# Patient Record
Sex: FEMALE | Race: OTHER | Employment: OTHER | URBAN - METROPOLITAN AREA
[De-identification: names, ages, dates, MRNs, and addresses within clinical notes are randomized per-mention and may not be internally consistent; named-entity substitution may affect disease eponyms.]

---

## 2020-08-04 LAB
CHLAMYDIA, EXTERNAL: NEGATIVE
N. GONORRHEA, EXTERNAL: NEGATIVE
NIPT, EXTERNAL: NEGATIVE
URINALYSIS, EXTERNAL: NO GROWTH

## 2020-09-23 ENCOUNTER — OFFICE VISIT (OUTPATIENT)
Dept: OBGYN CLINIC | Age: 30
End: 2020-09-23
Payer: COMMERCIAL

## 2020-09-23 VITALS
DIASTOLIC BLOOD PRESSURE: 68 MMHG | SYSTOLIC BLOOD PRESSURE: 110 MMHG | WEIGHT: 127.8 LBS | HEIGHT: 66 IN | BODY MASS INDEX: 20.54 KG/M2

## 2020-09-23 DIAGNOSIS — Z36.9 UNSPECIFIED ANTENATAL SCREENING: ICD-10-CM

## 2020-09-23 DIAGNOSIS — Z34.90 PREGNANCY, UNSPECIFIED GESTATIONAL AGE: ICD-10-CM

## 2020-09-23 DIAGNOSIS — Z23 ENCOUNTER FOR IMMUNIZATION: Primary | ICD-10-CM

## 2020-09-23 LAB
AFP, MATERNAL, EXTERNAL: NEGATIVE
ANTIBODY SCREEN, EXTERNAL: NEGATIVE
HBSAG, EXTERNAL: NEGATIVE
HCT, EXTERNAL: 30.3
HGB EVAL, EXTERNAL: NORMAL
HGB, EXTERNAL: 10.5
HIV, EXTERNAL: NON REACTIVE
PLATELET CNT,   EXTERNAL: 218
RPR, EXTERNAL: NON REACTIVE
RUBELLA, EXTERNAL: NORMAL
TYPE, ABO & RH, EXTERNAL: NORMAL
VARICELLA, EXTERNAL: NORMAL

## 2020-09-23 PROCEDURE — 90686 IIV4 VACC NO PRSV 0.5 ML IM: CPT

## 2020-09-23 PROCEDURE — 90471 IMMUNIZATION ADMIN: CPT

## 2020-09-23 PROCEDURE — 0502F SUBSEQUENT PRENATAL CARE: CPT | Performed by: OBSTETRICS & GYNECOLOGY

## 2020-09-23 NOTE — PROGRESS NOTES
Initial OB Visit Transfer    Navjot Barr is a 30 y.o. G1 at 18w6d by LMP = 6 and 12 wk US, now presenting for initial OB visit, transferring care from [employer] [facility]. Pt doing well. Some cramping. Denies vaginal bleeding, N/V or other symptoms. PMH of PCOS, this pregnancy was conceived with letrazole and timed IC. Pt was following with [facility] - Dr. Rebel Richards and Dr. Heidy Clay. Pt otherwise healthy. Pt presents with  Simeon Paige today. 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 Simeon Paige is . They are both [place], but met in [address] [facility]. Ob/Gyn Hx:     EDC- 21  LMP- Date: 20 (sure/unsure)  Menstrual pattern prior to conception: irregular  Contraception at time of conception? Date of 1st positive UPT:  STI- denies  ? SA- yes    Health maintenance:  Pap- 2018 NILM  Gardasil- 3/3    Substance history: negative for alcohol, tobacco and street drugs. Exposure history:   The patient was instructed to not change the cat litter. She denies close contact with children on a regular basis. She has had chicken pox or the vaccine in the past.   Patient denies issues with domestic violence. Genetic Screening/Teratology Counseling: (Includes patient, baby's father, or anyone in either family with:)  3.  Patient's age >/= 28 at [facility]?-- no  .   2. Thalassemia ([facility], [facility], [address], or  background): MCV<80?--no.     3.  Neural tube defect (meningomyelocele, spina bifida, anencephaly)?--no.   4.  Congenital heart defect?--no.  5.  Down syndrome?--no.   6.  Felix-Sachs (Anabaptism, [place])?--no.   7.  Canavan's Disease?--no.   8.  Familial Dysautonomia?--no.   9.  Sickle cell disease or trait ()? --no   The patient has not been tested for sickle trait  10. Hemophilia or other blood disorders?--no. 11.  Muscular dystrophy?--no. 12.  Cystic fibrosis?--no. 13.  Grainger's Chorea?--no.   14.  Mental retardation/autism (if yes was person tested for Fragile X)?--no. 15.  Other inherited genetic or chromosomal disorder?--no. 12.  Maternal metabolic disorder (DM, PKU, etc)?--no. 17.  Patient or FOB with a child with a birth defect not listed above?--no.  17a. Patient or FOB with a birth defect themselves?--no. 18.  Recurrent pregnancy loss, or stillbirth?--no. 19.  Any medications since LMP other than prenatal vitamins (include vitamins, supplements, OTC meds, drugs, alcohol)?--no. 20.  Any other genetic/environmental exposure to discuss?--no. Infection History:  1. Lives with someone with TB or TB exposed?--no.   2.  Patient or partner has history of genital herpes?--no.  3.  Rash or viral illness since LMP?--no.    4.  History of STD (GC, CT, HPV, syphilis, HIV)? --no   5. Other: OTHER? Denies known zika or coronavirus exposure    Past Medical History:   Diagnosis Date    PCOS (polycystic ovarian syndrome)      History reviewed. No pertinent surgical history.     Family History   Problem Relation Age of Onset    Hypertension Mother     Hypertension Father      Social History     Socioeconomic History    Marital status:      Spouse name: Not on file    Number of children: Not on file    Years of education: Not on file    Highest education level: Not on file   Occupational History    Not on file   Social Needs    Financial resource strain: Not on file    Food insecurity     Worry: Not on file     Inability: Not on file    Transportation needs     Medical: Not on file     Non-medical: Not on file   Tobacco Use    Smoking status: Never Smoker    Smokeless tobacco: Never Used   Substance and Sexual Activity    Alcohol use: Not Currently     Frequency: Never    Drug use: Never    Sexual activity: Yes     Partners: Male     Birth control/protection: None   Lifestyle    Physical activity     Days per week: Not on file     Minutes per session: Not on file    Stress: Not on file   Relationships    Social connections     Talks on phone: Not on file     Gets together: Not on file     Attends Spiritism service: Not on file     Active member of club or organization: Not on file     Attends meetings of clubs or organizations: Not on file     Relationship status: Not on file    Intimate partner violence     Fear of current or ex partner: Not on file     Emotionally abused: Not on file     Physically abused: Not on file     Forced sexual activity: Not on file   Other Topics Concern    Not on file   Social History Narrative    Not on file       Current Outpatient Medications   Medication Sig Dispense Refill    PNV MD.36/UILWGTZ fum/folic ac (PRENATAL PO) Take  by mouth.  cholecalciferol, vitamin D3, (VITAMIN D3 PO) Take  by mouth.  Lactobacillus acidophilus (PROBIOTIC PO) Take  by mouth.        No Known Allergies      Review of Systems - History obtained from the patient  Constitutional: negative for weight loss, fever, night sweats  HEENT: negative for hearing loss, earache, congestion, snoring, sorethroat  CV: negative for chest pain, palpitations, edema  Resp: negative for cough, shortness of breath, wheezing  GI: negative for change in bowel habits, abdominal pain, black or bloody stools  : negative for frequency, dysuria, hematuria, vaginal discharge  MSK: negative for back pain, joint pain, muscle pain  Breast: negative for breast lumps, nipple discharge, galactorrhea  Skin :negative for itching, rash, hives  Neuro: negative for dizziness, headache, confusion, weakness  Psych: negative for anxiety, depression, change in mood  Heme/lymph: negative for bleeding, bruising, pallor    Physical Exam    Visit Vitals  /68 (BP 1 Location: Right arm, BP Patient Position: Sitting)   Ht 5' 5.75\" (1.67 m)   Wt 127 lb 12.8 oz (58 kg)   LMP 05/14/2020   BMI 20.79 kg/m²       Constitutional  · Appearance: well-nourished, well developed, alert, in no acute distress    HENT  · Head and Face: appears normal    Neck  · Inspection/Palpation: normal appearance, no masses or tenderness  · Lymph Nodes: no lymphadenopathy present  · Thyroid: gland size normal, nontender, no nodules or masses present on palpation    Chest  · Respiratory Effort: non-labored breathing  · Auscultation: CTAB, normal breath sounds    Cardiovascular  · Heart:  · Auscultation: regular rate and rhythm without murmur  · Extremities: no peripheral edema    Gastrointestinal  · Abdominal Examination: abdomen non-tender to palpation, normal bowel sounds, no masses present  · Liver and spleen: no hepatomegaly present, spleen not palpable  · Hernias: no hernias identified    Skin  · General Inspection: no rash, no lesions identified    Neurologic/Psychiatric  · Mental Status:  · Orientation: grossly oriented to person, place and time  · Mood and Affect: mood normal, affect appropriate      Assessment/Plan:  Primary Provider: Jose Garcia    29 yo G1 with ENRIQUE 21 by LMP = 6 and 12 wk US transferring care from [facility] at 18 wks GA. IUP: valadez viable IUP  -Anatomy scan: next visit  -counseled on nutrition and proper weight gain in pregnancy as well as foods to avoid  -discussed other high yield topics including appropriate exercise levels, sexual activity, toxoplasmosis precautions, toxin avoidance, travel advice (including zika travel warnings)    Genetics/Carrier screeninst tri screen neg, MSAFP     PNL: GC and Chl neg / urine NG / pap NILM 2018  -Flu:   -Tdap:  -Rhogam:  -Glucola:  -28 week labs:  -GBS:  -Covid:    PMH:  -PCOS - conceived with letrazole    Pregnancy Problems: denies    Delivery/PP plans:  -Breast vs. Formula?  -NCB vs. Epid?  -Gender/Circ? 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 Ashlyn Vega is . They are both [place], but met in [address] [facility].     RTC: 2 wks for anatomy scan    Umesh Holcomb MD  2020  4:09 PM

## 2020-09-23 NOTE — PATIENT INSTRUCTIONS

## 2020-09-23 NOTE — PROGRESS NOTES
FLU  Patient in office for Flu injection, office supplied. After obtaining consent, and per orders of MD, injection of Flu vaccine given by Delfin Kimball LPN. Patient instructed to remain in clinic for 20 minutes afterwards, and to report any adverse reaction to me immediately.     Lot [id number]  Exp 6/30/21  Dose 0.5 ml  Site R deltoid  Route IM   [employer]  [address] [id number]

## 2020-09-27 LAB
ABO GROUP BLD: NORMAL
AFP ADJ MOM SERPL: 1.26
AFP INTERP SERPL-IMP: NORMAL
AFP INTERP SERPL-IMP: NORMAL
AFP SERPL-MCNC: 61.3 NG/ML
AGE AT DELIVERY: 29.1 YR
BLD GP AB SCN SERPL QL: NEGATIVE
COMMENT, 018013: NORMAL
ERYTHROCYTE [DISTWIDTH] IN BLOOD BY AUTOMATED COUNT: 12.7 % (ref 11.7–15.4)
GA METHOD: NORMAL
GA: 18 WEEKS
HBV SURFACE AG SERPL QL IA: NEGATIVE
HCT VFR BLD AUTO: 30.3 % (ref 34–46.6)
HGB A MFR BLD: 97.5 % (ref 96.4–98.8)
HGB A2 MFR BLD COLUMN CHROM: 2.5 % (ref 1.8–3.2)
HGB BLD-MCNC: 10.5 G/DL (ref 11.1–15.9)
HGB C MFR BLD: 0 %
HGB F MFR BLD: 0 % (ref 0–2)
HGB FRACT BLD-IMP: NORMAL
HGB OTHER MFR BLD HPLC: 0 %
HGB S BLD QL SOLY: NEGATIVE
HGB S MFR BLD: 0 %
HIV 1+2 AB+HIV1 P24 AG SERPL QL IA: NON REACTIVE
IDDM PATIENT QL: NO
MCH RBC QN AUTO: 34.2 PG (ref 26.6–33)
MCHC RBC AUTO-ENTMCNC: 34.7 G/DL (ref 31.5–35.7)
MCV RBC AUTO: 99 FL (ref 79–97)
MULTIPLE PREGNANCY: NO
NEURAL TUBE DEFECT RISK FETUS: 5411 %
PLATELET # BLD AUTO: 218 X10E3/UL (ref 150–450)
RBC # BLD AUTO: 3.07 X10E6/UL (ref 3.77–5.28)
RESULTS, 017004: NORMAL
RH BLD: POSITIVE
RPR SER QL: NON REACTIVE
RUBV IGG SERPL IA-ACNC: 1.59 INDEX
VZV IGG SER IA-ACNC: 2089 INDEX
WBC # BLD AUTO: 13.5 X10E3/UL (ref 3.4–10.8)

## 2020-09-28 NOTE — PROGRESS NOTES
O pos / ABSC neg / AA / hgb 10.5, plt 218 / HIV, hepB, rub, RPR, vzv all neg    MSAFP screen neg     Mildly anemic -  please advise iron rich diet, and iron supplementation with ferrous sulfate 325mg daily.

## 2020-10-01 ENCOUNTER — TELEPHONE (OUTPATIENT)
Dept: OBGYN CLINIC | Age: 30
End: 2020-10-01

## 2020-10-01 NOTE — TELEPHONE ENCOUNTER
10:33 am-  Left message on number given to advise that Dr. Caleb Waite agreed with our conversation. Call if any problems or concerns arise. Monitor for pain or bleeding.
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
## 2020-10-13 ENCOUNTER — ROUTINE PRENATAL (OUTPATIENT)
Dept: OBGYN CLINIC | Age: 30
End: 2020-10-13
Payer: COMMERCIAL

## 2020-10-13 VITALS — BODY MASS INDEX: 21.14 KG/M2 | DIASTOLIC BLOOD PRESSURE: 56 MMHG | SYSTOLIC BLOOD PRESSURE: 100 MMHG | WEIGHT: 130 LBS

## 2020-10-13 DIAGNOSIS — Z34.90 PREGNANCY, UNSPECIFIED GESTATIONAL AGE: Primary | ICD-10-CM

## 2020-10-13 PROCEDURE — 76805 OB US >/= 14 WKS SNGL FETUS: CPT | Performed by: OBSTETRICS & GYNECOLOGY

## 2020-10-13 PROCEDURE — 0502F SUBSEQUENT PRENATAL CARE: CPT | Performed by: OBSTETRICS & GYNECOLOGY

## 2020-10-13 NOTE — PATIENT INSTRUCTIONS

## 2020-10-13 NOTE — PROGRESS NOTES
Constipation --> discussed bowel regimen  +FM. No LOF or VB. Anatomy scan today with cardiac IVEF. Pt with normal 1st trimester screen. Reassurance provided that this is normal variant in 3-5% of normal pregnancies.      RTC 4 wk --> glucola at that visit

## 2020-11-11 ENCOUNTER — ROUTINE PRENATAL (OUTPATIENT)
Dept: OBGYN CLINIC | Age: 30
End: 2020-11-11
Payer: COMMERCIAL

## 2020-11-11 VITALS — BODY MASS INDEX: 22.12 KG/M2 | WEIGHT: 136 LBS | SYSTOLIC BLOOD PRESSURE: 110 MMHG | DIASTOLIC BLOOD PRESSURE: 70 MMHG

## 2020-11-11 DIAGNOSIS — Z34.90 PREGNANCY, UNSPECIFIED GESTATIONAL AGE: ICD-10-CM

## 2020-11-11 DIAGNOSIS — Z36.9 ENCOUNTER FOR ANTENATAL SCREENING OF MOTHER: Primary | ICD-10-CM

## 2020-11-11 LAB
BASOPHILS # BLD: 0 K/UL (ref 0–0.1)
BASOPHILS NFR BLD: 0 % (ref 0–1)
BLOOD BANK CMNT PATIENT-IMP: NORMAL
BLOOD GROUP ANTIBODIES SERPL: NORMAL
DIFFERENTIAL METHOD BLD: ABNORMAL
EOSINOPHIL # BLD: 0.1 K/UL (ref 0–0.4)
EOSINOPHIL NFR BLD: 0 % (ref 0–7)
ERYTHROCYTE [DISTWIDTH] IN BLOOD BY AUTOMATED COUNT: 12 % (ref 11.5–14.5)
HCT VFR BLD AUTO: 32 % (ref 35–47)
HGB BLD-MCNC: 10.7 G/DL (ref 11.5–16)
HIV 1+2 AB+HIV1 P24 AG SERPL QL IA: NONREACTIVE
HIV12 RESULT COMMENT, HHIVC: NORMAL
IMM GRANULOCYTES # BLD AUTO: 0.3 K/UL (ref 0–0.04)
IMM GRANULOCYTES NFR BLD AUTO: 2 % (ref 0–0.5)
LYMPHOCYTES # BLD: 1.8 K/UL (ref 0.8–3.5)
LYMPHOCYTES NFR BLD: 12 % (ref 12–49)
MCH RBC QN AUTO: 33.4 PG (ref 26–34)
MCHC RBC AUTO-ENTMCNC: 33.4 G/DL (ref 30–36.5)
MCV RBC AUTO: 100 FL (ref 80–99)
MONOCYTES # BLD: 0.6 K/UL (ref 0–1)
MONOCYTES NFR BLD: 4 % (ref 5–13)
NEUTS SEG # BLD: 12.4 K/UL (ref 1.8–8)
NEUTS SEG NFR BLD: 82 % (ref 32–75)
NRBC # BLD: 0 K/UL (ref 0–0.01)
NRBC BLD-RTO: 0 PER 100 WBC
PLATELET # BLD AUTO: 172 K/UL (ref 150–400)
PMV BLD AUTO: 9.9 FL (ref 8.9–12.9)
RBC # BLD AUTO: 3.2 M/UL (ref 3.8–5.2)
WBC # BLD AUTO: 15.1 K/UL (ref 3.6–11)

## 2020-11-11 PROCEDURE — 0502F SUBSEQUENT PRENATAL CARE: CPT | Performed by: OBSTETRICS & GYNECOLOGY

## 2020-11-11 NOTE — PATIENT INSTRUCTIONS
Weeks 22 to 26 of Your Pregnancy: Care Instructions  Your Care Instructions     As you enter your 7th month of pregnancy at week 26, your baby's lungs are growing stronger and getting ready to breathe. You may notice that your baby responds to the sound of your or your partner's voice. You may also notice that your baby does less turning and twisting and more squirming or jerking. Jerking often means that your baby has the hiccups. Hiccups are perfectly normal and are only temporary. You may want to think about attending a childbirth preparation class. This is also a good time to start thinking about whether you want to have pain medicine during labor. Most pregnant women are tested for gestational diabetes between weeks 25 and 28. Gestational diabetes occurs when your blood sugar level gets too high when you're pregnant. The test is important, because you can have gestational diabetes and not know it. But the condition can cause problems for your baby. Follow-up care is a key part of your treatment and safety. Be sure to make and go to all appointments, and call your doctor if you are having problems. It's also a good idea to know your test results and keep a list of the medicines you take. How can you care for yourself at home? Ease discomfort from your baby's kicking  · Change your position. Sometimes this will cause your baby to change position too. · Take a deep breath while you raise your arm over your head. Then breathe out while you drop your arm. Do Kegel exercises to prevent urine from leaking  · You can do Kegel exercises while you stand or sit. ? Squeeze the same muscles you would use to stop your urine. Your belly and thighs should not move. ? Hold the squeeze for 3 seconds, and then relax for 3 seconds. ? Start with 3 seconds. Then add 1 second each week until you are able to squeeze for 10 seconds. ? Repeat the exercise 10 to 15 times for each session.  Do three or more sessions each day.  Ease or reduce swelling in your feet, ankles, hands, and fingers  · If your fingers are puffy, take off your rings. · Do not eat high-salt foods, such as potato chips. · Prop up your feet on a stool or couch as much as possible. Sleep with pillows under your feet. · Do not stand for long periods of time or wear tight shoes. · Wear support stockings. Where can you learn more? Go to http://www.fuentes.com/  Enter G264 in the search box to learn more about \"Weeks 22 to 26 of Your Pregnancy: Care Instructions. \"  Current as of: February 11, 2020               Content Version: 12.6  © 2197-3631 [employer]. Care instructions adapted under license by [employer] (which disclaims liability or warranty for this information). If you have questions about a medical condition or this instruction, always ask your healthcare professional. [address] any warranty or liability for your use of this information.

## 2020-11-12 ENCOUNTER — TELEPHONE (OUTPATIENT)
Dept: OBGYN CLINIC | Age: 30
End: 2020-11-12

## 2020-11-12 LAB
GLUCOSE 1H P 100 G GLC PO SERPL-MCNC: 173 MG/DL (ref 65–140)
T PALLIDUM AB SER QL IA: NON REACTIVE

## 2020-11-12 NOTE — PROGRESS NOTES
glucola 173 --> needs 3 hr OGTT    hgb 10.7, mildly anemic-->  please advise iron rich diet, and iron supplementation with ferrous sulfate 325mg daily.     Plts 172

## 2020-11-12 NOTE — TELEPHONE ENCOUNTER
MD Valentín Rm Lurlene Burger, RN    Caller: Unspecified (Today,  9:37 AM)               Glucola was elevated. Please inform her that she needs to return for 3 hr OGTT. Blood sugar problems can sometimes cause dizziness. Also encourage good hydration. Justino Helton MD   11/12/2020 12:53 PM       glucola 173 --> needs 3 hr OGTT       hgb 10.7, mildly anemic-->  please advise iron rich diet, and iron supplementation with ferrous sulfate 325mg daily.       Plts 172              Patient advised of MD reviewed labs and recommendations and patient was placed on the schedule to have the 3 hour GTT    Patient was provided with instructions regarding the lab testing. Patient verbalized understanding.

## 2020-11-12 NOTE — TELEPHONE ENCOUNTER
Call received at 9:35am      30year old patient  26wod pregnant last seen in the office yesterday. Patient states every thing is ok with the baby    Patient reports she has been feeling dizzy since yesterday     This nurse advised it may be related to drinking the glucose yesterday.     Patient wondering about her glucose test results    Please review and advise

## 2020-11-13 ENCOUNTER — LAB ONLY (OUTPATIENT)
Dept: OBGYN CLINIC | Age: 30
End: 2020-11-13

## 2020-11-13 DIAGNOSIS — R73.09 ELEVATED GLUCOSE: Primary | ICD-10-CM

## 2020-11-13 LAB
GESTATIONAL 3HR GTT,GESTA: ABNORMAL
GLUCOSE 1H P 100 G GLC PO SERPL-MCNC: 130 MG/DL (ref 65–180)
GLUCOSE P FAST SERPL-MCNC: 70 MG/DL (ref 65–95)
GLUCOSE, 2 HR,GSTT2: 140 MG/DL (ref 65–155)
GLUCOSE, 3 HR,GSTT3: 146 MG/DL (ref 65–140)

## 2020-11-16 NOTE — PROGRESS NOTES
Only 1 abnormal value on 3 hr OGTT. Does NOT meet diagnostic criteria for GDM. May have slight degree of insulin resistance.

## 2020-12-10 ENCOUNTER — ROUTINE PRENATAL (OUTPATIENT)
Dept: OBGYN CLINIC | Age: 30
End: 2020-12-10
Payer: COMMERCIAL

## 2020-12-10 VITALS — WEIGHT: 140 LBS | BODY MASS INDEX: 22.77 KG/M2 | DIASTOLIC BLOOD PRESSURE: 64 MMHG | SYSTOLIC BLOOD PRESSURE: 110 MMHG

## 2020-12-10 DIAGNOSIS — Z23 ENCOUNTER FOR IMMUNIZATION: ICD-10-CM

## 2020-12-10 DIAGNOSIS — Z34.90 PREGNANCY, UNSPECIFIED GESTATIONAL AGE: Primary | ICD-10-CM

## 2020-12-10 PROCEDURE — 90471 IMMUNIZATION ADMIN: CPT | Performed by: OBSTETRICS & GYNECOLOGY

## 2020-12-10 PROCEDURE — 0502F SUBSEQUENT PRENATAL CARE: CPT | Performed by: OBSTETRICS & GYNECOLOGY

## 2020-12-10 PROCEDURE — 90715 TDAP VACCINE 7 YRS/> IM: CPT

## 2020-12-10 NOTE — PROGRESS NOTES
Patient in office for Tdap injection, office supplied. After obtaining consent, and per orders of MD, injection of Tdap given by Grover Cochran LPN. Patient instructed to remain in clinic for 20 minutes afterwards, and to report any adverse reaction to me immediately.     Lot [id number]   Exp 11/7/22  Dose 0.5 ml  Site left deltoid  Route IM   [employer] [phone]

## 2020-12-10 NOTE — PROGRESS NOTES
+FM  Tdap today  3 hr OGTT with 1 abnormal value.  Discussed avoidance of excess carb intake, but does not need strict diabetic diet as she does not meet criteria for GDM    RTC 2 wk

## 2020-12-10 NOTE — PATIENT INSTRUCTIONS

## 2020-12-22 ENCOUNTER — ROUTINE PRENATAL (OUTPATIENT)
Dept: OBGYN CLINIC | Age: 30
End: 2020-12-22
Payer: COMMERCIAL

## 2020-12-22 VITALS — DIASTOLIC BLOOD PRESSURE: 66 MMHG | SYSTOLIC BLOOD PRESSURE: 110 MMHG | WEIGHT: 141 LBS | BODY MASS INDEX: 22.93 KG/M2

## 2020-12-22 DIAGNOSIS — Z34.90 PREGNANCY, UNSPECIFIED GESTATIONAL AGE: Primary | ICD-10-CM

## 2020-12-22 PROCEDURE — 0502F SUBSEQUENT PRENATAL CARE: CPT | Performed by: OBSTETRICS & GYNECOLOGY

## 2020-12-22 NOTE — PATIENT INSTRUCTIONS

## 2020-12-22 NOTE — PROGRESS NOTES
Doing well.    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

## 2021-01-06 ENCOUNTER — ROUTINE PRENATAL (OUTPATIENT)
Dept: OBGYN CLINIC | Age: 31
End: 2021-01-06
Payer: COMMERCIAL

## 2021-01-06 VITALS — BODY MASS INDEX: 23.26 KG/M2 | SYSTOLIC BLOOD PRESSURE: 110 MMHG | DIASTOLIC BLOOD PRESSURE: 68 MMHG | WEIGHT: 143 LBS

## 2021-01-06 DIAGNOSIS — Z34.90 PREGNANCY, UNSPECIFIED GESTATIONAL AGE: Primary | ICD-10-CM

## 2021-01-06 PROCEDURE — 0502F SUBSEQUENT PRENATAL CARE: CPT | Performed by: OBSTETRICS & GYNECOLOGY

## 2021-01-06 NOTE — PROGRESS NOTES
+FM, no LOF or VB. No ctx. S<D, FH measuring 31 at ~34 wk. Growth scan next visit. No other concerns.     Discussed risks/benefits of covid vaccine

## 2021-01-06 NOTE — PATIENT INSTRUCTIONS
Weeks 32 to 34 of Your Pregnancy: Care Instructions  Your Care Instructions     During the last few weeks of your pregnancy, you may have more aches and pains. It's important to rest when you can. Your growing baby is putting more pressure on your bladder. So you may need to urinate more often. Hemorrhoids are also common. These are painful, itchy veins in the rectal area. In the 36th week, most women have a test for group B streptococcus (GBS). GBS is a common bacteria that can live in the vagina and rectum. It can make your baby sick after birth. If you test positive, you will get antibiotics during labor. These will keep your baby from getting the bacteria. You may want to talk with your doctor about banking your baby's umbilical cord blood. This is the blood left in the cord after birth. If you want to save this blood, you must arrange it ahead of time. You can't decide at the last minute. If you haven't already had the Tdap shot during this pregnancy, talk to your doctor about getting it. It will help protect your  against pertussis infection. Follow-up care is a key part of your treatment and safety. Be sure to make and go to all appointments, and call your doctor if you are having problems. It's also a good idea to know your test results and keep a list of the medicines you take. How can you care for yourself at home? Ease hemorrhoids  · Get more liquids, fruits, vegetables, and fiber in your diet. This will help keep your stools soft. · Avoid sitting for too long. Lie on your left side several times a day. · Clean yourself with soft, moist toilet paper. Or you can use witch hazel pads or personal hygiene pads. · If you are uncomfortable, try ice packs. Or you can sit in a warm sitz bath. Do these for 20 minutes at a time, as needed. · Use hydrocortisone cream for pain and itching. Two examples are Anusol and Preparation H Hydrocortisone.   · Ask your doctor about taking an over-the-counter stool softener. Consider breastfeeding  · Experts recommend that women breastfeed for 1 year or longer. · Breast milk may help protect your child from some health problems.  babies are less likely than formula-fed babies to:  ? Get ear infections, colds, diarrhea, and pneumonia. ? Be obese or get diabetes later in life. · Women who breastfeed have less bleeding after the birth. Their uteruses also shrink back faster. · Some women who breastfeed lose weight faster. Making milk burns calories. · Breastfeeding can lower your risk of breast cancer, ovarian cancer, and osteoporosis. Decide about circumcision for boys  · As you make this decision, it may help to think about your personal, Moravian, and family traditions. You get to decide if you will keep your son's penis natural or if he will be circumcised. · If you decide that you would like to have your baby circumcised, talk with your doctor. You can share your concerns about pain. And you can discuss your preferences for anesthesia. Where can you learn more? Go to http://www.[employer].com/  Enter X711 in the search box to learn more about \"Weeks 32 to 34 of Your Pregnancy: Care Instructions. \"  Current as of: February 11, 2020               Content Version: 12.6  © 6515-6321 [employer]. Care instructions adapted under license by [employer] (which disclaims liability or warranty for this information). If you have questions about a medical condition or this instruction, always ask your healthcare professional. [place] [address] any warranty or liability for your use of this information.

## 2021-01-20 ENCOUNTER — ROUTINE PRENATAL (OUTPATIENT)
Dept: OBGYN CLINIC | Age: 31
End: 2021-01-20
Payer: COMMERCIAL

## 2021-01-20 VITALS — WEIGHT: 146 LBS | SYSTOLIC BLOOD PRESSURE: 104 MMHG | BODY MASS INDEX: 23.75 KG/M2 | DIASTOLIC BLOOD PRESSURE: 66 MMHG

## 2021-01-20 DIAGNOSIS — Z34.90 PREGNANCY, UNSPECIFIED GESTATIONAL AGE: Primary | ICD-10-CM

## 2021-01-20 PROCEDURE — 76815 OB US LIMITED FETUS(S): CPT | Performed by: OBSTETRICS & GYNECOLOGY

## 2021-01-20 PROCEDURE — 0502F SUBSEQUENT PRENATAL CARE: CPT | Performed by: OBSTETRICS & GYNECOLOGY

## 2021-01-20 NOTE — PATIENT INSTRUCTIONS

## 2021-01-20 NOTE — PROGRESS NOTES
Nausea occasionally  +FM  Tightening occasionally    LIMITED OB SCAN  A SINGLE VERTEX 35W6D IUP IS SEEN. FETAL CARDIAC MOTION OBSERVED. LIMITED ANATOMY WAS VISUALIZED AND APPEARS WNL. APPROPRIATE GROWTH MEASURED. SIZE = DATES. ASHUTOSH AND PLACENTA APPEAR WITHIN NORMAL LIMITS.     RTC 1 wk, gbs at that visit

## 2021-01-27 ENCOUNTER — ROUTINE PRENATAL (OUTPATIENT)
Dept: OBGYN CLINIC | Age: 31
End: 2021-01-27
Payer: COMMERCIAL

## 2021-01-27 VITALS — DIASTOLIC BLOOD PRESSURE: 62 MMHG | WEIGHT: 149 LBS | BODY MASS INDEX: 24.23 KG/M2 | SYSTOLIC BLOOD PRESSURE: 110 MMHG

## 2021-01-27 DIAGNOSIS — Z34.03 ENCOUNTER FOR SUPERVISION OF NORMAL FIRST PREGNANCY IN THIRD TRIMESTER: Primary | ICD-10-CM

## 2021-01-27 PROCEDURE — 0502F SUBSEQUENT PRENATAL CARE: CPT | Performed by: ADVANCED PRACTICE MIDWIFE

## 2021-01-27 NOTE — PATIENT INSTRUCTIONS

## 2021-01-27 NOTE — PROGRESS NOTES
Pt is doing well. No concerns, gbs today. +FM, no s/s of pre-e or  labor. Discussed recurrence of nausea in the morning, offered remedies including varghese/peppermint, avoiding empty stomach. GBS today. Pt would like to discuss Dr. Venice Orellana induction policy at next visit. Will see in 1 wk for GREG.

## 2021-01-28 LAB
BACTERIA SPEC CULT: ABNORMAL
BACTERIA SPEC CULT: ABNORMAL
SERVICE CMNT-IMP: ABNORMAL

## 2021-02-03 ENCOUNTER — ROUTINE PRENATAL (OUTPATIENT)
Dept: OBGYN CLINIC | Age: 31
End: 2021-02-03
Payer: COMMERCIAL

## 2021-02-03 ENCOUNTER — HOSPITAL ENCOUNTER (OUTPATIENT)
Dept: PREADMISSION TESTING | Age: 31
Discharge: HOME | End: 2021-02-03
Payer: COMMERCIAL

## 2021-02-03 ENCOUNTER — TRANSCRIBE ORDER (OUTPATIENT)
Dept: REGISTRATION | Age: 31
End: 2021-02-03

## 2021-02-03 VITALS
WEIGHT: 150.6 LBS | BODY MASS INDEX: 24.2 KG/M2 | SYSTOLIC BLOOD PRESSURE: 118 MMHG | HEIGHT: 66 IN | DIASTOLIC BLOOD PRESSURE: 60 MMHG

## 2021-02-03 DIAGNOSIS — Z34.90 PREGNANCY, UNSPECIFIED GESTATIONAL AGE: Primary | ICD-10-CM

## 2021-02-03 DIAGNOSIS — Z01.812 PRE-PROCEDURE LAB EXAM: ICD-10-CM

## 2021-02-03 DIAGNOSIS — Z01.812 PRE-PROCEDURE LAB EXAM: Primary | ICD-10-CM

## 2021-02-03 PROCEDURE — U0003 INFECTIOUS AGENT DETECTION BY NUCLEIC ACID (DNA OR RNA); SEVERE ACUTE RESPIRATORY SYNDROME CORONAVIRUS 2 (SARS-COV-2) (CORONAVIRUS DISEASE [COVID-19]), AMPLIFIED PROBE TECHNIQUE, MAKING USE OF HIGH THROUGHPUT TECHNOLOGIES AS DESCRIBED BY CMS-2020-01-R: HCPCS

## 2021-02-03 PROCEDURE — 0502F SUBSEQUENT PRENATAL CARE: CPT | Performed by: OBSTETRICS & GYNECOLOGY

## 2021-02-03 NOTE — PATIENT INSTRUCTIONS

## 2021-02-03 NOTE — PROGRESS NOTES
Pt reports good fetal movement, slightly more subtle than before, but still consistent and at least 5-10x perhour. She is having constipation, reviewed bowel regimen, discussed cutting back on iron supplementation  No LOF or VB. No ctx. Declines cervical check today. Covid testing today. GBS neg.     RTC 1 wk, labor precautions

## 2021-02-04 LAB — SARS-COV-2, COV2NT: NOT DETECTED

## 2021-02-10 ENCOUNTER — HOSPITAL ENCOUNTER (INPATIENT)
Age: 31
LOS: 3 days | Discharge: HOME | End: 2021-02-13
Attending: OBSTETRICS & GYNECOLOGY | Admitting: ADVANCED PRACTICE MIDWIFE
Payer: COMMERCIAL

## 2021-02-10 ENCOUNTER — ANESTHESIA (OUTPATIENT)
Dept: LABOR AND DELIVERY | Age: 31
End: 2021-02-10
Payer: COMMERCIAL

## 2021-02-10 ENCOUNTER — ANESTHESIA EVENT (OUTPATIENT)
Dept: LABOR AND DELIVERY | Age: 31
End: 2021-02-10
Payer: COMMERCIAL

## 2021-02-10 ENCOUNTER — ROUTINE PRENATAL (OUTPATIENT)
Dept: OBGYN CLINIC | Age: 31
End: 2021-02-10
Payer: COMMERCIAL

## 2021-02-10 VITALS
HEIGHT: 66 IN | WEIGHT: 150.6 LBS | BODY MASS INDEX: 24.2 KG/M2 | DIASTOLIC BLOOD PRESSURE: 60 MMHG | SYSTOLIC BLOOD PRESSURE: 95 MMHG

## 2021-02-10 DIAGNOSIS — Z3A.38 38 WEEKS GESTATION OF PREGNANCY: ICD-10-CM

## 2021-02-10 DIAGNOSIS — Z34.90 PREGNANCY, UNSPECIFIED GESTATIONAL AGE: Primary | ICD-10-CM

## 2021-02-10 DIAGNOSIS — Z37.9 NORMAL LABOR: ICD-10-CM

## 2021-02-10 LAB
ERYTHROCYTE [DISTWIDTH] IN BLOOD BY AUTOMATED COUNT: 12.1 % (ref 11.5–14.5)
HCT VFR BLD AUTO: 34 % (ref 35–47)
HGB BLD-MCNC: 12 G/DL (ref 11.5–16)
MCH RBC QN AUTO: 34.5 PG (ref 26–34)
MCHC RBC AUTO-ENTMCNC: 35.3 G/DL (ref 30–36.5)
MCV RBC AUTO: 97.7 FL (ref 80–99)
NRBC # BLD: 0 K/UL (ref 0–0.01)
NRBC BLD-RTO: 0 PER 100 WBC
PLATELET # BLD AUTO: 141 K/UL (ref 150–400)
PMV BLD AUTO: 10.5 FL (ref 8.9–12.9)
RBC # BLD AUTO: 3.48 M/UL (ref 3.8–5.2)
WBC # BLD AUTO: 13.2 K/UL (ref 3.6–11)

## 2021-02-10 PROCEDURE — A4300 CATH IMPL VASC ACCESS PORTAL: HCPCS

## 2021-02-10 PROCEDURE — 76060000078 HC EPIDURAL ANESTHESIA

## 2021-02-10 PROCEDURE — 0502F SUBSEQUENT PRENATAL CARE: CPT | Performed by: OBSTETRICS & GYNECOLOGY

## 2021-02-10 PROCEDURE — 3E0R3BZ INTRODUCTION OF ANESTHETIC AGENT INTO SPINAL CANAL, PERCUTANEOUS APPROACH: ICD-10-PCS | Performed by: ANESTHESIOLOGY

## 2021-02-10 PROCEDURE — 74011250636 HC RX REV CODE- 250/636: Performed by: ADVANCED PRACTICE MIDWIFE

## 2021-02-10 PROCEDURE — 85027 COMPLETE CBC AUTOMATED: CPT

## 2021-02-10 PROCEDURE — 00HU33Z INSERTION OF INFUSION DEVICE INTO SPINAL CANAL, PERCUTANEOUS APPROACH: ICD-10-PCS | Performed by: ANESTHESIOLOGY

## 2021-02-10 PROCEDURE — 75410000002 HC LABOR FEE PER 1 HR

## 2021-02-10 PROCEDURE — 77030019905 HC CATH URETH INTMIT MDII -A

## 2021-02-10 PROCEDURE — 75810000275 HC EMERGENCY DEPT VISIT NO LEVEL OF CARE

## 2021-02-10 PROCEDURE — 36415 COLL VENOUS BLD VENIPUNCTURE: CPT

## 2021-02-10 PROCEDURE — 74011000250 HC RX REV CODE- 250: Performed by: ANESTHESIOLOGY

## 2021-02-10 PROCEDURE — 74011000258 HC RX REV CODE- 258: Performed by: ADVANCED PRACTICE MIDWIFE

## 2021-02-10 PROCEDURE — 65270000029 HC RM PRIVATE

## 2021-02-10 RX ORDER — BUTORPHANOL TARTRATE 1 MG/ML
2 INJECTION INTRAMUSCULAR; INTRAVENOUS
Status: DISCONTINUED | OUTPATIENT
Start: 2021-02-10 | End: 2021-02-11

## 2021-02-10 RX ORDER — SODIUM CHLORIDE 0.9 % (FLUSH) 0.9 %
5-40 SYRINGE (ML) INJECTION AS NEEDED
Status: DISCONTINUED | OUTPATIENT
Start: 2021-02-10 | End: 2021-02-11

## 2021-02-10 RX ORDER — EPHEDRINE SULFATE/0.9% NACL/PF 50 MG/5 ML
12.5 SYRINGE (ML) INTRAVENOUS
Status: DISCONTINUED | OUTPATIENT
Start: 2021-02-10 | End: 2021-02-11

## 2021-02-10 RX ORDER — OXYTOCIN/RINGER'S LACTATE 30/500 ML
87.3 PLASTIC BAG, INJECTION (ML) INTRAVENOUS AS NEEDED
Status: COMPLETED | OUTPATIENT
Start: 2021-02-10 | End: 2021-02-11

## 2021-02-10 RX ORDER — BUPIVACAINE HYDROCHLORIDE 5 MG/ML
30 INJECTION, SOLUTION EPIDURAL; INTRACAUDAL AS NEEDED
Status: DISCONTINUED | OUTPATIENT
Start: 2021-02-10 | End: 2021-02-11

## 2021-02-10 RX ORDER — SODIUM CHLORIDE, SODIUM LACTATE, POTASSIUM CHLORIDE, CALCIUM CHLORIDE 600; 310; 30; 20 MG/100ML; MG/100ML; MG/100ML; MG/100ML
125 INJECTION, SOLUTION INTRAVENOUS CONTINUOUS
Status: DISCONTINUED | OUTPATIENT
Start: 2021-02-10 | End: 2021-02-11

## 2021-02-10 RX ORDER — OXYTOCIN/RINGER'S LACTATE 30/500 ML
0-20 PLASTIC BAG, INJECTION (ML) INTRAVENOUS
Status: DISCONTINUED | OUTPATIENT
Start: 2021-02-10 | End: 2021-02-11

## 2021-02-10 RX ORDER — FENTANYL/BUPIVACAINE/NS/PF 2-1250MCG
1-16 PREFILLED PUMP RESERVOIR EPIDURAL CONTINUOUS
Status: DISCONTINUED | OUTPATIENT
Start: 2021-02-10 | End: 2021-02-11

## 2021-02-10 RX ORDER — OXYTOCIN/RINGER'S LACTATE 30/500 ML
10 PLASTIC BAG, INJECTION (ML) INTRAVENOUS AS NEEDED
Status: COMPLETED | OUTPATIENT
Start: 2021-02-10 | End: 2021-02-11

## 2021-02-10 RX ORDER — BUPIVACAINE HYDROCHLORIDE 5 MG/ML
INJECTION, SOLUTION EPIDURAL; INTRACAUDAL
Status: COMPLETED | OUTPATIENT
Start: 2021-02-10 | End: 2021-02-10

## 2021-02-10 RX ORDER — TERBUTALINE SULFATE 1 MG/ML
0.25 INJECTION SUBCUTANEOUS AS NEEDED
Status: DISCONTINUED | OUTPATIENT
Start: 2021-02-10 | End: 2021-02-11

## 2021-02-10 RX ORDER — SODIUM CHLORIDE 0.9 % (FLUSH) 0.9 %
5-40 SYRINGE (ML) INJECTION EVERY 8 HOURS
Status: DISCONTINUED | OUTPATIENT
Start: 2021-02-10 | End: 2021-02-11

## 2021-02-10 RX ORDER — NALOXONE HYDROCHLORIDE 0.4 MG/ML
0.4 INJECTION, SOLUTION INTRAMUSCULAR; INTRAVENOUS; SUBCUTANEOUS AS NEEDED
Status: DISCONTINUED | OUTPATIENT
Start: 2021-02-10 | End: 2021-02-11

## 2021-02-10 RX ORDER — FENTANYL CITRATE 50 UG/ML
100 INJECTION, SOLUTION INTRAMUSCULAR; INTRAVENOUS
Status: DISCONTINUED | OUTPATIENT
Start: 2021-02-10 | End: 2021-02-11

## 2021-02-10 RX ADMIN — BUPIVACAINE HYDROCHLORIDE 5 MG: 5 INJECTION, SOLUTION EPIDURAL; INTRACAUDAL; PERINEURAL at 20:08

## 2021-02-10 RX ADMIN — AMPICILLIN SODIUM 2 G: 2 INJECTION, POWDER, FOR SOLUTION INTRAMUSCULAR; INTRAVENOUS at 20:27

## 2021-02-10 RX ADMIN — Medication 10 ML/HR: at 20:26

## 2021-02-10 RX ADMIN — SODIUM CHLORIDE, POTASSIUM CHLORIDE, SODIUM LACTATE AND CALCIUM CHLORIDE 125 ML/HR: 600; 310; 30; 20 INJECTION, SOLUTION INTRAVENOUS at 21:03

## 2021-02-10 RX ADMIN — AMPICILLIN SODIUM 1 G: 1 INJECTION, POWDER, FOR SOLUTION INTRAMUSCULAR; INTRAVENOUS at 23:43

## 2021-02-10 RX ADMIN — SODIUM CHLORIDE, POTASSIUM CHLORIDE, SODIUM LACTATE AND CALCIUM CHLORIDE 1000 ML: 600; 310; 30; 20 INJECTION, SOLUTION INTRAVENOUS at 20:03

## 2021-02-10 RX ADMIN — OXYTOCIN 1 MILLI-UNITS/MIN: 10 INJECTION INTRAVENOUS at 23:43

## 2021-02-10 NOTE — PROGRESS NOTES
+FM  Patient said decreased movement, but still feels at least 5x per hour  Belly tightening, difficult to feel baby move while belly is tight (longest lasting time was 2 hours of not feeling movement while belly was tight). Constipation resolving. Stopped iron.   BPP today 8/8, ASHUTOSH 7 --> repeat BPP in 1 week if still pregnant  Cervix 3-4cm/50/-3, labor precautions    RTC 1 wk

## 2021-02-10 NOTE — PATIENT INSTRUCTIONS

## 2021-02-11 PROCEDURE — 74011250636 HC RX REV CODE- 250/636: Performed by: ADVANCED PRACTICE MIDWIFE

## 2021-02-11 PROCEDURE — 59410 OBSTETRICAL CARE: CPT | Performed by: ADVANCED PRACTICE MIDWIFE

## 2021-02-11 PROCEDURE — 74011250637 HC RX REV CODE- 250/637: Performed by: ADVANCED PRACTICE MIDWIFE

## 2021-02-11 PROCEDURE — 77030019905 HC CATH URETH INTMIT MDII -A

## 2021-02-11 PROCEDURE — 0KQM0ZZ REPAIR PERINEUM MUSCLE, OPEN APPROACH: ICD-10-PCS | Performed by: ADVANCED PRACTICE MIDWIFE

## 2021-02-11 PROCEDURE — 74011000258 HC RX REV CODE- 258: Performed by: ADVANCED PRACTICE MIDWIFE

## 2021-02-11 PROCEDURE — 65410000002 HC RM PRIVATE OB

## 2021-02-11 PROCEDURE — 75410000003 HC RECOV DEL/VAG/CSECN EA 0.5 HR

## 2021-02-11 PROCEDURE — 75410000002 HC LABOR FEE PER 1 HR

## 2021-02-11 PROCEDURE — 75410000000 HC DELIVERY VAGINAL/SINGLE

## 2021-02-11 PROCEDURE — 74011250637 HC RX REV CODE- 250/637: Performed by: OBSTETRICS & GYNECOLOGY

## 2021-02-11 RX ORDER — OXYTOCIN/RINGER'S LACTATE 30/500 ML
87.3 PLASTIC BAG, INJECTION (ML) INTRAVENOUS AS NEEDED
Status: DISCONTINUED | OUTPATIENT
Start: 2021-02-11 | End: 2021-02-13 | Stop reason: HOSPADM

## 2021-02-11 RX ORDER — DOCUSATE SODIUM 100 MG/1
100 CAPSULE, LIQUID FILLED ORAL
Status: DISCONTINUED | OUTPATIENT
Start: 2021-02-11 | End: 2021-02-13 | Stop reason: HOSPADM

## 2021-02-11 RX ORDER — ACETAMINOPHEN 325 MG/1
650 TABLET ORAL
Status: DISCONTINUED | OUTPATIENT
Start: 2021-02-11 | End: 2021-02-13 | Stop reason: HOSPADM

## 2021-02-11 RX ORDER — IBUPROFEN 400 MG/1
800 TABLET ORAL EVERY 8 HOURS
Status: DISCONTINUED | OUTPATIENT
Start: 2021-02-11 | End: 2021-02-13 | Stop reason: HOSPADM

## 2021-02-11 RX ORDER — HYDROCORTISONE ACETATE PRAMOXINE HCL 2.5; 1 G/100G; G/100G
CREAM TOPICAL AS NEEDED
Status: DISCONTINUED | OUTPATIENT
Start: 2021-02-11 | End: 2021-02-13 | Stop reason: HOSPADM

## 2021-02-11 RX ORDER — OXYTOCIN/RINGER'S LACTATE 30/500 ML
10 PLASTIC BAG, INJECTION (ML) INTRAVENOUS AS NEEDED
Status: DISCONTINUED | OUTPATIENT
Start: 2021-02-11 | End: 2021-02-13 | Stop reason: HOSPADM

## 2021-02-11 RX ORDER — ZOLPIDEM TARTRATE 5 MG/1
5 TABLET ORAL
Status: DISCONTINUED | OUTPATIENT
Start: 2021-02-11 | End: 2021-02-13 | Stop reason: HOSPADM

## 2021-02-11 RX ORDER — HYDROCODONE BITARTRATE AND ACETAMINOPHEN 5; 325 MG/1; MG/1
1 TABLET ORAL
Status: DISCONTINUED | OUTPATIENT
Start: 2021-02-11 | End: 2021-02-13 | Stop reason: HOSPADM

## 2021-02-11 RX ORDER — NALOXONE HYDROCHLORIDE 0.4 MG/ML
0.4 INJECTION, SOLUTION INTRAMUSCULAR; INTRAVENOUS; SUBCUTANEOUS AS NEEDED
Status: DISCONTINUED | OUTPATIENT
Start: 2021-02-11 | End: 2021-02-13 | Stop reason: HOSPADM

## 2021-02-11 RX ADMIN — IBUPROFEN 800 MG: 400 TABLET, FILM COATED ORAL at 06:00

## 2021-02-11 RX ADMIN — OXYTOCIN 87.3 MILLI-UNITS/MIN: 10 INJECTION INTRAVENOUS at 04:52

## 2021-02-11 RX ADMIN — DOCUSATE SODIUM 100 MG: 100 CAPSULE, LIQUID FILLED ORAL at 09:10

## 2021-02-11 RX ADMIN — OXYTOCIN 10000 MILLI-UNITS: 10 INJECTION INTRAVENOUS at 04:42

## 2021-02-11 RX ADMIN — IBUPROFEN 800 MG: 400 TABLET, FILM COATED ORAL at 21:47

## 2021-02-11 RX ADMIN — IBUPROFEN 800 MG: 400 TABLET, FILM COATED ORAL at 14:10

## 2021-02-11 RX ADMIN — AMPICILLIN SODIUM 1 G: 1 INJECTION, POWDER, FOR SOLUTION INTRAMUSCULAR; INTRAVENOUS at 03:46

## 2021-02-11 NOTE — H&P
Contractions         H&P     Name: Lux Card MRN: [id number]  SSN: [id number]    YOB: 1990  Age: 35 y.o. Sex: female          Subjective:      Estimated Date of Delivery: 21           OB History         1    Para        Term                AB        Living            SAB        TAB        Ectopic        Molar        Multiple        Live Births                     34 yo  SIUP @ 38 weeks 6 days presents to [facility] for evaluation of contracions starting 2 hours ago getting stronger and closer together. Pt was seen in the office today for a routine visit- she was 3-4 cm         Prenatal Labs:         Lab Results   Component Value Date/Time     Rubella, External Immune 2020     HBsAg, External Negative 2020     HIV, External Non Reactive 2020     RPR, External Non Reactive 2020     Gonorrhea, External Negative 2020     Chlamydia, External Negative 2020              Patient Active Problem List     Diagnosis    Pregnant       Primary Provider: Patrick Diaz     31 yo G1 with ENRIQUE 21 by LMP = 6 and 12 wk US transferred care from [facility] at 18 wks GA.      IUP: anatomy scan 10/13 - IVEF --> Reassurance provided that this is normal variant in 3-5% of normal pregnancies, declines NIPT or diagnostic testing --> growth scan 20 at 35w6d showing EFW 37%ile     Genetics/Carrier screeninst tri screen neg, MSAFP Negative     PNL:O pos / ABSC neg / AA / hgb 10.5 --> 10.7, plt 218 --> 172 / HIV, hepB, rub, RPR, vzv all neg / GC and Chl neg / urine NG / pap NILM 2018 / glucola 173 --> 3 hr with only 1 abnormal value  -Flu:    -Tdap: 12/10  -GBS: Positive --> PCN intrapartum  -Covid: neg     PMH:  -PCOS - conceived with letrazole     Pregnancy Problems:  -Anemia     Delivery/PP plans:  -Breast, interested in classes  -NCB vs. Epid?  -Gender/Circ?     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  Quang Weston is . They are both [facility], but met in [place].            No specialty comments available. Past Medical History:   Diagnosis Date    PCOS (polycystic ovarian syndrome)              Past Surgical History:   Procedure Laterality Date    HX GYN         hysterosalpingogram      Social History            Occupational History    Not on file   Tobacco Use    Smoking status: Never Smoker    Smokeless tobacco: Never Used   Substance and Sexual Activity    Alcohol use: Not Currently       Frequency: Never    Drug use: Never    Sexual activity: Yes       Partners: Male       Birth control/protection: None            Family History   Problem Relation Age of Onset    Hypertension Mother      Hypertension Father           No Known Allergies          Prior to Admission medications    Medication Sig Start Date End Date Taking? Authorizing Provider   ferrous sulfate (IRON PO) Take  by mouth.       Provider, Historical   PNV HB.54/AIANPHW fum/folic ac (PRENATAL PO) Take  by mouth.       Provider, Historical   cholecalciferol, vitamin D3, (VITAMIN D3 PO) Take  by mouth.       Provider, Historical   Lactobacillus acidophilus (PROBIOTIC PO) Take  by mouth.       Provider, Historical         Review of Systems   Gastrointestinal: Positive for abdominal pain. Contractions, gravid   All other systems reviewed and are negative.           Objective:      Vitals: There were no vitals filed for this visit.      Physical Exam:  Physical Exam  Vitals signs and nursing note reviewed. Exam conducted with a chaperone present. Constitutional:       Appearance: Normal appearance. HENT:      Head: Normocephalic and atraumatic. Left Ear: Tympanic membrane normal.      Nose: Nose normal.      Mouth/Throat:      Mouth: Mucous membranes are moist.   Neck:      Musculoskeletal: Normal range of motion. Cardiovascular:      Rate and Rhythm: Normal rate.    Pulmonary:      Effort: Pulmonary effort is normal. Abdominal:      General: There is distension. Tenderness: There is abdominal tenderness. Comments: Gravid, contractions   Genitourinary:     General: Normal vulva. Musculoskeletal: Normal range of motion. Skin:     General: Skin is warm and dry. Capillary Refill: Capillary refill takes less than 2 seconds. Neurological:      General: No focal deficit present. Mental Status: She is alert and oriented to person, place, and time. Psychiatric:         Mood and Affect: Mood normal.         Behavior: Behavior normal.            Patient without distress.   Fundus: soft and non tender  Perineum: blood absent, amniotic fluid absent  Cervical Exam:  5/100/-1/  Membranes:  Intact  Fetal Heart Rate: dop tones in Triage appropriate         MDM  Number of Diagnoses or Management Options     Amount and/or Complexity of Data Reviewed  Review and summarize past medical records: yes (PNR)  Independent visualization of images, tracings, or specimens: yes (EFM)     Risk of Complications, Morbidity, and/or Mortality  Presenting problems: moderate  Diagnostic procedures: moderate  Management options: moderate   Critical Care  Total time providing critical care: < 30 minutes        Procedures           Assessment/Plan:   36 yo G1 SIUP @ 38 weeks 6 days   Active labor  GBS pos  Covid ?     Plan:   Admit   Labs  Pain management as desires   abx  Signed By:  Kathleen Luciano CNM      February 10, 2021

## 2021-02-11 NOTE — L&D DELIVERY NOTE
Delivery Summary    Patient: Viky Nunez MRN: [id number]  SSN: [id number]    YOB: 1990  Age: 35 y.o. Sex: female       Information for the patient's :  Katheryn Deal [[id number]]       Labor Events:    Labor: No    Steroids: None   Cervical Ripening Date/Time:       Cervical Ripening Type: None   Antibiotics During Labor:     Rupture Identifier:      Rupture Date/Time: 2/10/2021 10:50 PM   Rupture Type: SROM   Amniotic Fluid Volume:      Amniotic Fluid Description: Clear    Amniotic Fluid Odor: None    Induction:         Induction Date/Time:        Indications for Induction:      Augmentation: Oxytocin   Augmentation Date/Time: 111:43 PM   Indications for Augmentation: Ineffective Contraction Pattern   Labor complications: None       Additional complications:        Delivery Events:  Indications For Episiotomy:     Episiotomy: None   Perineal Laceration(s): 2nd   Repaired: Yes   Periurethral Laceration Location:      Repaired:     Labial Laceration Location:     Repaired:     Sulcal Laceration Location:     Repaired:     Vaginal Laceration Location:     Repaired:     Cervical Laceration Location:     Repaired:     Repair Suture: Rapide 3-0   Number of Repair Packets:     Estimated Blood Loss (ml):  ml   Quantitative Blood Loss (ml)                Delivery Date: 2021    Delivery Time: 4:33 AM  Delivery Type: Vaginal, Spontaneous  Sex:  Male    Gestational Age: 39w0d   Delivery Clinician:  Leonardo Patel  Living Status: Living   Delivery Location: L&D            APGARS  One minute Five minutes Ten minutes   Skin color: 1   1        Heart rate: 2   2        Grimace: 2   2        Muscle tone: 2   2        Breathin   2        Totals: 9   9            Presentation: Vertex    Position:        Resuscitation Method:  Tactile Stimulation     Meconium Stained: None      Cord Information: 3 Vessels  Complications: None  Cord around:    Delayed cord clamping? Yes  Cord clamped date/time:2021  4:34 AM  Disposition of Cord Blood: Lab    Blood Gases Sent?: No    Placenta:  Date/Time: 2021  4:41 AM  Removal: Spontaneous      Appearance: Normal      Measurements:  Birth Weight:        Birth Length:        Head Circumference:        Chest Circumference:       Abdominal Girth: Other Providers:   ;DANA Barcenas;;;;;;Tristin SYKES, Obstetrician;Primary Nurse;Primary  Nurse;Nicu Nurse;Neonatologist;Anesthesiologist;Crna;Nurse Practitioner;Midwife;Nursery Nurse           Group B Strep: No results found for: GRBSEXT, GRBSEXT  Information for the patient's :  Devi Duffy [[id number]]     Lab Results   Component Value Date/Time    ABO/Rh(D) O POSITIVE 2021 04:49 AM    SHENA IgG PENDING 2021 04:49 AM    Bilirubin if SHENA pos: IF DIRECT LEX POSITIVE, BILIRUBIN TO FOLLOW 2021 04:49 AM      No results for input(s): PCO2CB, PO2CB, HCO3I, SO2I, IBD, PTEMPI, SPECTI, PHICB, ISITE, IDEV, IALLEN in the last 72 hours.

## 2021-02-11 NOTE — PROGRESS NOTES
2335 - Bedside and Verbal shift change report given to MAXINE Escalante RN (oncoming nurse) by Brandie Lemus RNC (offgoing nurse). Report included the following information SBAR, Kardex, Intake/Output, MAR, Accordion, Recent Results and Med Rec Status.

## 2021-02-11 NOTE — PROGRESS NOTES
Pt feeling well this morning following delivery. Bleeding appropriate. No f/c. Nursing baby. No other concerns.     Visit Vitals  /66 (BP 1 Location: Right upper arm, BP Patient Position: At rest)   Pulse 79   Temp 98.3 °F (36.8 °C)   Resp 16   Ht 5' 5\" (1.651 m)   Wt 150 lb (68 kg)   LMP 05/14/2020   SpO2 93%   Breastfeeding Unknown   BMI 24.96 kg/m²

## 2021-02-11 NOTE — PROGRESS NOTES
Spiritual Care Assessment/Progress Note  [facility]      NAME: Josette Mckeon      MRN: [id number]  AGE: 35 y.o. SEX: female  Pentecostalism Affiliation: Non Hinduism   Language: English     2/11/2021     Total Time (in minutes): 15     Spiritual Assessment begun in [address] through conversation with:         [x]Patient        [x] Family    [] Friend(s)        Reason for Consult: Initial/Spiritual assessment, patient floor, Request by patient     Spiritual beliefs: (Please include comment if needed)     [x] Identifies with a ella tradition:         [] Supported by a ella community:            [] Claims no spiritual orientation:           [] Seeking spiritual identity:                [] Adheres to an individual form of spirituality:           [] Not able to assess:                           Identified resources for coping:      [x] Prayer                               [] Music                  [] Guided Imagery     [x] Family/friends                 [] Pet visits     [] Devotional reading                         [] Unknown     [] Other:                                              Interventions offered during this visit: (See comments for more details)    Patient Interventions: Affirmation of emotions/emotional suffering, Affirmation of ella, Normalization of emotional/spiritual concerns, Prayer (actual), Prayer (assurance of)     Family/Friend(s):  Affirmation of emotions/emotional suffering, Affirmation of ella, Normalization of emotional/spiritual concerns, Prayer (actual), Prayer (assurance of)     Plan of Care:     [x] Support spiritual and/or cultural needs    [] Support AMD and/or advance care planning process      [] Support grieving process   [] Coordinate Rites and/or Rituals    [] Coordination with community clergy   [] No spiritual needs identified at this time   [] Detailed Plan of Care below (See Comments)  [] Make referral to Music Therapy  [] Make referral to Pet Therapy     [] Make referral to Addiction services  [] Make referral to [facility]  [] Make referral to Spiritual Care Partner  [] No future visits requested        [x] Follow up upon further referrals     Comments:  for initial visit. Pt welcomed visit and pt's mother was at bedside. Pt shared that she would like prayer for her and her baby boy.  provided pastoral listening, support and prayer. Let her know of chaplains continued support. Please contact [id number] [facility] for further support.      [address] Lillian Khalil, MACE   [phone])

## 2021-02-11 NOTE — ED PROVIDER NOTES
36 yo  SIUP @ 38 weeks 6 days presents to [facility] for evaluation of contracions starting 2 hours ago getting stronger and closer together. Pt was seen in the office today for a routine visit- she was 3-4 cm       Contractions        [facility] Provider Note    Name: Jayden Gentile MRN: [id number]  SSN: [id number]    YOB: 1990  Age: 35 y.o. Sex: female        Subjective:     Estimated Date of Delivery: 21  OB History        1    Para        Term                AB        Living           SAB        TAB        Ectopic        Molar        Multiple        Live Births                        Prenatal Labs:   Lab Results   Component Value Date/Time    Rubella, External Immune 2020    HBsAg, External Negative 2020    HIV, External Non Reactive 2020    RPR, External Non Reactive 2020    Gonorrhea, External Negative 2020    Chlamydia, External Negative 2020        Patient Active Problem List    Diagnosis    Pregnant     Primary Provider: Temi Brown    31 yo G1 with ENRIQUE 21 by LMP = 6 and 12 wk US transferred care from [facility] at 18 wks GA. IUP: anatomy scan 10/13 - IVEF --> Reassurance provided that this is normal variant in 3-5% of normal pregnancies, declines NIPT or diagnostic testing --> growth scan 20 at 35w6d showing EFW 37%ile    Genetics/Carrier screeninst tri screen neg, MSAFP Negative    PNL:O pos / ABSC neg / AA / hgb 10.5 --> 10.7, plt 218 --> 172 / HIV, hepB, rub, RPR, vzv all neg / GC and Chl neg / urine NG / pap NILM 2018 / glucola 173 --> 3 hr with only 1 abnormal value  -Flu:    -Tdap: 12/10  -GBS: Positive --> PCN intrapartum  -Covid: neg    PMH:  -PCOS - conceived with letrazole    Pregnancy Problems:  -Anemia    Delivery/PP plans:  -Breast, interested in classes  -NCB vs. Epid?  -Gender/Circ? 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 Edward Mcfarland is .  They are both [facility], but met in [facility]. No specialty comments available. Past Medical History:   Diagnosis Date    PCOS (polycystic ovarian syndrome)      Past Surgical History:   Procedure Laterality Date    HX GYN      hysterosalpingogram     Social History     Occupational History    Not on file   Tobacco Use    Smoking status: Never Smoker    Smokeless tobacco: Never Used   Substance and Sexual Activity    Alcohol use: Not Currently     Frequency: Never    Drug use: Never    Sexual activity: Yes     Partners: Male     Birth control/protection: None     Family History   Problem Relation Age of Onset    Hypertension Mother     Hypertension Father        No Known Allergies  Prior to Admission medications    Medication Sig Start Date End Date Taking? Authorizing Provider   ferrous sulfate (IRON PO) Take  by mouth. Provider, Historical   PNV GN.07/DOUKRWJ fum/folic ac (PRENATAL PO) Take  by mouth. Provider, Historical   cholecalciferol, vitamin D3, (VITAMIN D3 PO) Take  by mouth. Provider, Historical   Lactobacillus acidophilus (PROBIOTIC PO) Take  by mouth. Provider, Historical        Review of Systems   Gastrointestinal: Positive for abdominal pain. Contractions, gravid   All other systems reviewed and are negative. Objective:     Vitals: There were no vitals filed for this visit. Physical Exam:  Physical Exam  Vitals signs and nursing note reviewed. Exam conducted with a chaperone present. Constitutional:       Appearance: Normal appearance. HENT:      Head: Normocephalic and atraumatic. Left Ear: Tympanic membrane normal.      Nose: Nose normal.      Mouth/Throat:      Mouth: Mucous membranes are moist.   Neck:      Musculoskeletal: Normal range of motion. Cardiovascular:      Rate and Rhythm: Normal rate. Pulmonary:      Effort: Pulmonary effort is normal.   Abdominal:      General: There is distension. Tenderness: There is abdominal tenderness.       Comments: Gravid, contractions   Genitourinary:     General: Normal vulva. Musculoskeletal: Normal range of motion. Skin:     General: Skin is warm and dry. Capillary Refill: Capillary refill takes less than 2 seconds. Neurological:      General: No focal deficit present. Mental Status: She is alert and oriented to person, place, and time. Psychiatric:         Mood and Affect: Mood normal.         Behavior: Behavior normal.         Patient without distress.   Fundus: soft and non tender  Perineum: blood absent, amniotic fluid absent  Cervical Exam:  5/100/-1/  Membranes:  Intact  Fetal Heart Rate: dop tones in Triage appropriate       MDM  Number of Diagnoses or Management Options     Amount and/or Complexity of Data Reviewed  Review and summarize past medical records: yes (PNR)  Independent visualization of images, tracings, or specimens: yes (EFM)    Risk of Complications, Morbidity, and/or Mortality  Presenting problems: moderate  Diagnostic procedures: moderate  Management options: moderate    Critical Care  Total time providing critical care: < 30 minutes      Procedures        Assessment/Plan:   34 yo G1 SIUP @ 38 weeks 6 days   Active labor    Plan:   Admit   Labs  Pain management as desires   Signed By:  Jackson Denver, CNM     February 10, 2021

## 2021-02-11 NOTE — ROUTINE PROCESS
0730: Bedside shift change report given to MAXINE Diehl RN (oncoming nurse) by LOGAN Foster RN (offgoing nurse). Report included the following information SBAR. 1030: Pt assisted to bathroom and voided. Jacqueline care completed and pt assisted back to bed. Pt tolerated well, denies dizziness. Check void by 1630. 
1142: Pt assisted to bathroom and voided. Jacqueline care completed and pt assisted back to bed. Pt tolerated well, denies dizziness. Check void complete.

## 2021-02-11 NOTE — ANESTHESIA POSTPROCEDURE EVALUATION
* No procedures listed *.    epidural    Anesthesia Post Evaluation        Patient location during evaluation: PACU  Patient participation: complete - patient participated  Level of consciousness: awake and alert  Pain management: adequate  Airway patency: patent  Anesthetic complications: no  Cardiovascular status: acceptable  Respiratory status: acceptable  Hydration status: acceptable  Comments: I have seen and evaluated the patient and is ready for discharge. Matthieu Reyna MD    Post anesthesia nausea and vomiting:  none      INITIAL Post-op Vital signs:   Vitals Value Taken Time   /61 02/11/21 0645   Temp     Pulse 82 02/11/21 0645   Resp     SpO2     Vitals shown include unvalidated device data.

## 2021-02-11 NOTE — ROUTINE PROCESS
TRANSFER - IN REPORT: 
 
Verbal report received from Marylou(name) on [place]  being received from L&D(unit) for routine progression of care Report consisted of patients Situation, Background, Assessment and  
Recommendations(SBAR). Information from the following report(s) SBAR was reviewed with the receiving nurse. Opportunity for questions and clarification was provided. Assessment completed upon patients arrival to unit and care assumed.

## 2021-02-11 NOTE — PROGRESS NOTES
2330: Bedside shift change report given to MAXINE Shukla (oncoming nurse) by Ct Virgen RN (offgoing nurse). Report included the following information SBAR, Intake/Output, MAR and Recent Results. 0320: L. Merdis Zaira at bedside. 2358:     0715: TRANSFER - OUT REPORT:    Verbal report given to MIRI Cutler RN (name) on [facility]  being transferred to [facility] (unit) for routine progression of care       Report consisted of patients Situation, Background, Assessment and   Recommendations(SBAR). Information from the following report(s) SBAR, Intake/Output, MAR and Recent Results was reviewed with the receiving nurse. Lines:   Peripheral IV 02/10/21 Right Hand (Active)   Site Assessment Clean, dry, & intact 02/10/21 2348   Phlebitis Assessment 0 02/10/21 2348   Infiltration Assessment 0 02/10/21 2348   Dressing Status Clean, dry, & intact 02/10/21 2348   Dressing Type Tape;Transparent 02/10/21 2348   Hub Color/Line Status Pink 02/10/21 2348        Opportunity for questions and clarification was provided.       Patient transported with:   Registered Nurse

## 2021-02-11 NOTE — ANESTHESIA PROCEDURE NOTES
Epidural Block    Patient location during procedure: OB  Start time: 2/10/2021 8:08 PM  End time: 2/10/2021 8:20 PM  Reason for block: labor epidural  Staffing  Performed: attending   Anesthesiologist: Corinthia Snellen, MD  Preanesthetic Checklist  Completed: patient identified, IV checked, site marked, risks and benefits discussed, surgical consent, monitors and equipment checked, pre-op evaluation and timeout performed  Block Placement  Patient position: sitting  Prep: ChloraPrep  Sterility prep: cap, drape, gloves and mask  Sedation level: no sedation  Patient monitoring: continuous pulse oximetry and heart rate  Approach: midline  Location: lumbar  Lumbar location: L4-L5  Epidural  Loss of resistance technique: saline  Guidance: landmark technique  Needle  Needle type: Tuohy   Needle gauge: 17 G  Needle length: 9 cm  Needle insertion depth: 7 cm  Catheter type: end hole  Catheter size: 19 G  Catheter at skin depth: 12 cm  Catheter securement method: clear occlusive dressing, liquid medical adhesive and surgical tape  Medications Administered  Bupivacaine (PF) (MARCAINE) 0.5 % (5 mg/mL) Epidural, 5 mg  Assessment  Sensory level: T6  Block outcome: pain improved  Number of attempts: 1  Procedure assessment: patient tolerated procedure well with no complications

## 2021-02-12 PROCEDURE — 74011250637 HC RX REV CODE- 250/637: Performed by: ADVANCED PRACTICE MIDWIFE

## 2021-02-12 PROCEDURE — 74011250637 HC RX REV CODE- 250/637: Performed by: OBSTETRICS & GYNECOLOGY

## 2021-02-12 PROCEDURE — 65410000002 HC RM PRIVATE OB

## 2021-02-12 RX ADMIN — IBUPROFEN 800 MG: 400 TABLET, FILM COATED ORAL at 19:07

## 2021-02-12 RX ADMIN — IBUPROFEN 800 MG: 400 TABLET, FILM COATED ORAL at 07:03

## 2021-02-12 RX ADMIN — DOCUSATE SODIUM 100 MG: 100 CAPSULE, LIQUID FILLED ORAL at 21:37

## 2021-02-12 RX ADMIN — ACETAMINOPHEN 650 MG: 325 TABLET ORAL at 14:22

## 2021-02-12 RX ADMIN — DOCUSATE SODIUM 100 MG: 100 CAPSULE, LIQUID FILLED ORAL at 07:04

## 2021-02-12 NOTE — DISCHARGE INSTRUCTIONS
Postpartum Support Groups  We know that all of us are dealing with a tremendous amount of uncertainty, confusion and disruption to our daily lives, which may result in increased anxiety, depression and fear. If you are feeling unsettled or worse, please know that we are here to help. During this time of increased caution and care for one another, [facility] [place] ([address] [place] [address]) is offering virtual support groups to ALL MOTHERS in [place] regardless of the age of your child/children as a way to help weather this emotional storm together. Social support is an important part of self-care during this time of physical distancing. Virtual postpartum support group meetings available at www. postpartumva.org  Warm Line: 181.170.6621    POST DELIVERY DISCHARGE INSTRUCTIONS    Name: Alecia Richmond  YOB: 1990  Primary Diagnosis: Active Problems:    Normal labor (2/10/2021)      38 weeks gestation of pregnancy (2/10/2021)        General:     Diet/Diet Restrictions:  Eight 8-ounce glasses of fluid daily (water, juices); avoid excessive caffeine intake. Meals/snacks as desired which are high in fiber and carbohydrates and low in fat and cholesterol. Physical Activity / Restrictions / Safety:     Avoid heavy lifting, no more that 8 lbs. For 2-3 weeks; No driving while taking narcotic pain medication. Post  patients should not drive until pain free. No intercourse 4-6 weeks, no douching or tampon use. May resume exercise in 6 weeks. Discharge Instructions/Special Treatment/Home Care Needs:     Continue prenatal vitamins. Continue to use squirt bottle with warm water on your episiotomy after each bathroom use until bleeding stops. If steri-strips applied to your incision, remove in 7 days. Take stool softeners daily. Call your doctor for the following:     Fever over 101 degrees by mouth. Vaginal bleeding heavier than a normal menstrual period or lost larger than a golf ball.   Red streaks or increased swelling of legs, painful red streaks on your breast.  Painful urination, or increased pain, redness or discharge with your incision. Pain Management:     Pain Management:   Take Acetaminophen (Tylenol) or Ibuprofen (Advil, Motrin), as directed for pain. Use a warm Sitz bath 3 times daily to relieve episiotomy or hemorrhoidal discomfort. Heating pad to  incision as needed. For hemorrhoidal discomfort, use Tucks and Anusol cream as needed and directed.     Follow-Up Care:     Appointment with MD:   Follow-up Appointments   Procedures    FOLLOW UP VISIT Appointment in: 6 Weeks     Standing Status:   Standing     Number of Occurrences:   1     Order Specific Question:   Appointment in     Answer:   Dalton Miranda     Telephone number: [phone]    Signed By: Altagracia De Anda MD                                                                                                   Date: 2021 Time: 10:13 AM

## 2021-02-12 NOTE — DISCHARGE SUMMARY
Obstetrical Discharge Summary     Name: Esther Mahajan MRN: [id number]  SSN: [id number]    YOB: 1990  Age: 35 y.o. Sex: female      Admit Date: 2/10/2021    Discharge Date: 2021     Admitting Physician: Kendall Frank CNM     Attending Physician:  Irma Newman*     Admission Diagnoses: 38 weeks gestation of pregnancy [Z3A.38]; Normal labor [O80, Z37.9]    Discharge Diagnoses: TSVD  Information for the patient's :  Yolette Cruz [[id number]]   Delivery of a 8 lb 1.3 oz (3.665 kg) male infant via Vaginal, Spontaneous on 2021 at 4:33 AM  by Kendall Frank. Apgars were 9  and 9 . Additional Diagnoses:   Hospital Problems  Date Reviewed: 2/10/2021          Codes Class Noted POA    Normal labor ICD-10-CM: O80, Z37.9  ICD-9-CM: 976  2/10/2021 Unknown        38 weeks gestation of pregnancy ICD-10-CM: Z3A.38  ICD-9-CM: V22.2  2/10/2021 Unknown             Lab Results   Component Value Date/Time    Rubella, External Immune 2020       Hospital Course: Pt admitted in term labor, uncomplicated TSVD. Normal hospital course following the delivery. Patient Instructions:   Current Discharge Medication List      CONTINUE these medications which have NOT CHANGED    Details   PNV DOROTEO.79/GOMYEUN fum/folic ac (PRENATAL PO) Take  by mouth. cholecalciferol, vitamin D3, (VITAMIN D3 PO) Take  by mouth. Lactobacillus acidophilus (PROBIOTIC PO) Take  by mouth. ferrous sulfate (IRON PO) Take  by mouth. Reference my discharge instructions.     Follow-up Appointments   Procedures    FOLLOW UP VISIT Appointment in: 6 Weeks     Standing Status:   Standing     Number of Occurrences:   1     Order Specific Question:   Appointment in     Answer:   6 Weeks        Signed By:  Riki Christie MD     2021

## 2021-02-12 NOTE — LACTATION NOTE
This note was copied from a baby's chart. Initial Lactation Consultation -  Baby born vaginally early this morning to a  mom at 43 weeks gestation. Mom has a history of PCOS and infertility requiring medication to get pregnant. She did notice breast changes during her pregnancy. Mom states baby has been latching and nursing but he keeps falling asleep on the breast.     I watched mom latch the baby and gave her some tips on positioning baby at the breast and then how to help him get latched deeply. We reviewed normal  feeding periods and how baby alternates sucking, swallowing and breathing. Resting between sucking bursts is normal.     We were able to get baby latched deeply. He was sucking with audible swallows. Feeding Plan: Mother will keep baby skin to skin as often as possible, feed on demand, respond to feeding cues, obtain latch, listen for audible swallowing, be aware of signs of oxytocin release/ cramping, thirst and sleepiness while breastfeeding. Mom will not limit the time the baby is at the breast. She will allow the baby to completely finish one breast and then offer the second breast at each feeding.

## 2021-02-12 NOTE — ROUTINE PROCESS
1930: Bedside shift change report given to MIRI Maloney (oncoming nurse) by Claire Ponce RN (offgoing nurse). Report included the following information SBAR.

## 2021-02-12 NOTE — PROGRESS NOTES
Postpartum Progress Note    Subjective: Pt doing well. No acute events overnight. Pain controlled. Lochia less than menses. Tolerating diet, ambulating and voiding without difficulty. Scant edema. Breastfeeding without difficulty. No other concerns. Objective:  Patient Vitals for the past 24 hrs:   Temp Pulse Resp BP   02/12/21 0453 98.3 °F (36.8 °C) 76 16 102/61   02/11/21 2150 97.9 °F (36.6 °C) 80 16 116/78   02/11/21 1541 98.9 °F (37.2 °C) 90 18 100/60       Physical Exam:  Gen-A&O, NAD, resting comfortably   CV-regular rate  Resp-non-labored  Abd-nt, nd, fundus firm below the umbilicus  -scant blood on pad  Ext-trace edema    Assessment/Plan:  29yo PPD1 s/p TSVD, uncomplicated. Overall doing well.      Routine postpartum care:  -general diet  -voiding without difficulty  -ice packs, peripads to perineum prn  -cont to monitor bleeding  -ibuprofen/oxycodone for pain control  -stool softeners prn    PMH: benign    Postpartum plans:  -breastfeeding   -male, defer circ to outpatient urology due to prominent hydrocele, small size and slight webbing    Dispo: anticipate discharge home PPD1-2  -follow up in 6 wks for routine PP visit or sooner prn    Hola Meza MD  [facility] [place]

## 2021-02-13 VITALS
DIASTOLIC BLOOD PRESSURE: 68 MMHG | BODY MASS INDEX: 24.99 KG/M2 | SYSTOLIC BLOOD PRESSURE: 107 MMHG | TEMPERATURE: 98 F | OXYGEN SATURATION: 93 % | HEIGHT: 65 IN | RESPIRATION RATE: 14 BRPM | HEART RATE: 76 BPM | WEIGHT: 150 LBS

## 2021-02-13 PROCEDURE — 74011250637 HC RX REV CODE- 250/637: Performed by: ADVANCED PRACTICE MIDWIFE

## 2021-02-13 RX ORDER — DOCUSATE SODIUM 100 MG/1
100 CAPSULE, LIQUID FILLED ORAL
Qty: 30 CAP | Refills: 1 | Status: SHIPPED | OUTPATIENT
Start: 2021-02-13 | End: 2021-05-14

## 2021-02-13 RX ORDER — IBUPROFEN 800 MG/1
800 TABLET ORAL EVERY 8 HOURS
Qty: 30 TAB | Refills: 1 | Status: SHIPPED | OUTPATIENT
Start: 2021-02-13

## 2021-02-13 RX ADMIN — IBUPROFEN 800 MG: 400 TABLET, FILM COATED ORAL at 04:17

## 2021-02-13 NOTE — DISCHARGE SUMMARY
Obstetrical Discharge Summary     Name: Julia Chavez MRN: [id number]  SSN: [id number]    YOB: 1990  Age: 35 y.o. Sex: female      Admit Date: 2/10/2021    Discharge Date: 2021     Admitting Physician: Kelsy Anderson CNM     Attending Physician:  Macario Reeves MD     Admission Diagnoses: 38 weeks gestation of pregnancy [Z3A.38]  Normal labor [O80, Z37.9]    Procedure Performed:  * No surgery found *  Surgical  Epidural, 2nd degree laceration       Discharge Diagnoses:   Information for the patient's :  Darrell Bennett [[id number]]   Delivery of a 8 lb 1.3 oz (3.665 kg) male infant via Vaginal, Spontaneous on 2021 at 4:33 AM  by Kelsy Anderson. Apgars were 9  and 9 . Additional Diagnoses:   Hospital Problems  Date Reviewed: 2/10/2021          Codes Class Noted POA    Normal labor ICD-10-CM: O80, Z37.9  ICD-9-CM: 437  2/10/2021 Unknown        38 weeks gestation of pregnancy ICD-10-CM: Z3A.38  ICD-9-CM: V22.2  2/10/2021 Unknown             Lab Results   Component Value Date/Time    Rubella, External Immune 2020       Hospital Course: Normal hospital course following the delivery. Patient Disposition: Home      Followup Care:  Discharge Condition: stable  Activity as tolerated and No sex for 6 weeks  Regular Diet      Patient Instructions:   Current Discharge Medication List      START taking these medications    Details   ibuprofen (MOTRIN) 800 mg tablet Take 1 Tab by mouth every eight (8) hours. Indications: pain  Qty: 30 Tab, Refills: 1    Associated Diagnoses: Vaginal delivery      docusate sodium (COLACE) 100 mg capsule Take 1 Cap by mouth daily as needed for Constipation for up to 90 days. Qty: 30 Cap, Refills: 1    Associated Diagnoses: Vaginal delivery         CONTINUE these medications which have NOT CHANGED    Details   PNV DE.31/OFLWUAQ fum/folic ac (PRENATAL PO) Take  by mouth.       cholecalciferol, vitamin D3, (VITAMIN D3 PO) Take  by mouth.      Lactobacillus acidophilus (PROBIOTIC PO) Take  by mouth.      ferrous sulfate (IRON PO) Take  by mouth.             Reference my discharge instructions.    Follow-up Appointments   Procedures   • FOLLOW UP VISIT Appointment in: 6 Weeks     Standing Status:   Standing     Number of Occurrences:   1     Order Specific Question:   Appointment in     Answer:   6 Weeks        Signed By:  Ade Mitchell NP     February 13, 2021

## 2021-02-13 NOTE — PROGRESS NOTES
Bedside and Verbal shift change report given to ROHIT Angel RN (oncoming nurse) by Rozina Madden RN (offgoing nurse). Report included the following information SBAR.

## 2021-02-13 NOTE — LACTATION NOTE
This note was copied from a baby's chart. Baby nursing well and has improved throughout post partum stay, deep latch maintained, mother is comfortable, milk is in transition, baby feeding vigorously with rhythmic suck, swallow, breathe pattern, with audible swallowing, and evident milk transfer, both breasts offered, baby is asleep following feeding. Baby is feeding on demand, voiding (4) and stools (3) present as appropriate since birth. Weight loss:  8.3%     Breasts may become engorged when milk \"comes in\". How milk is made / normal phases of milk production, supply and demand discussed. Taught care of engorged breasts - frequent breastfeeding encouraged, warm compresses and breast massage ac. Then nurse the baby or pump. Apply cold compresses pc x 15 minutes a few times a day for swelling or discomfort. May need to do this care for a couple of days. Discussed prevention and treatment of mastitis.

## 2021-02-13 NOTE — PROGRESS NOTES
Postpartum Note    S/P , PPD#2  Ambulating and Voiding without diff  Dianna po and po meds well  Breastfeeding well established  Desires discharge home    O:  A,A&O x 3        VSS, Afebrile         Patient Vitals for the past 24 hrs:   Temp Pulse Resp BP   21 0217 98.1 °F (36.7 °C) 74 16 105/67   21 2133 98 °F (36.7 °C) 80 16 108/72   21 1730 98.3 °F (36.8 °C) 86 14 121/74   21 1120 98.1 °F (36.7 °C) 88 14 111/69          Breasts soft, NT       Abd soft, NT/ND       FF@ U-1, ML       Scant Lochia Rubra       Perineum Intact       Ext without REP, Neg Aydin's    A/P: Routine PP care          Discharge home in stable cond. RTO 6 wks for PP exam, sooner prn          KAIA Leonardo/GABO

## 2022-03-19 PROBLEM — Z34.90 PREGNANT: Status: ACTIVE | Noted: 2020-09-23

## 2022-03-19 PROBLEM — Z37.9 NORMAL LABOR: Status: ACTIVE | Noted: 2021-02-10

## 2022-03-19 PROBLEM — Z3A.38 38 WEEKS GESTATION OF PREGNANCY: Status: ACTIVE | Noted: 2021-02-10

## 2023-05-15 RX ORDER — IBUPROFEN 800 MG/1
800 TABLET ORAL EVERY 8 HOURS
COMMUNITY
Start: 2021-02-13